# Patient Record
Sex: FEMALE | Race: BLACK OR AFRICAN AMERICAN | URBAN - METROPOLITAN AREA
[De-identification: names, ages, dates, MRNs, and addresses within clinical notes are randomized per-mention and may not be internally consistent; named-entity substitution may affect disease eponyms.]

---

## 2024-06-04 ENCOUNTER — HOME HEALTH ADMISSION (OUTPATIENT)
Dept: HOME HEALTH SERVICES | Facility: HOME HEALTH | Age: 74
End: 2024-06-04
Payer: MEDICARE

## 2024-06-05 ENCOUNTER — HOME CARE VISIT (OUTPATIENT)
Dept: SCHEDULING | Facility: HOME HEALTH | Age: 74
End: 2024-06-05

## 2024-06-05 VITALS
SYSTOLIC BLOOD PRESSURE: 164 MMHG | RESPIRATION RATE: 18 BRPM | DIASTOLIC BLOOD PRESSURE: 91 MMHG | HEART RATE: 87 BPM | TEMPERATURE: 97.9 F | OXYGEN SATURATION: 97 %

## 2024-06-05 PROCEDURE — 0221000100 HH NO PAY CLAIM PROCEDURE

## 2024-06-05 PROCEDURE — G0151 HHCP-SERV OF PT,EA 15 MIN: HCPCS

## 2024-06-05 ASSESSMENT — ENCOUNTER SYMPTOMS: PAIN LOCATION - PAIN QUALITY: DULL ACHE

## 2024-06-06 ENCOUNTER — HOME CARE VISIT (OUTPATIENT)
Dept: SCHEDULING | Facility: HOME HEALTH | Age: 74
End: 2024-06-06

## 2024-06-06 PROCEDURE — G0152 HHCP-SERV OF OT,EA 15 MIN: HCPCS

## 2024-06-06 NOTE — HOME HEALTH
Primary focus of care and skilled reason for admission/summary of clinical condition: Other specified rheumatoid arthritis, left knee     Subjective: Pt is an 73 yo female s/p recent appointment with her pcp with complaints of worsening L knee pain. Pt underwent cortisone injection. Skilled PT intervention orders received. PMH includes: RA, OA, Falls, Lupus, HTN, obesity.    Caregiver is available: Pt's son is available to assist as needed.  Medications reconciled and all medications are being management by the pt  Education was provided regarding medications, caregiver is able to demonstrate knowledge through teach back with 100 percent accuracy.    Following medication reconciliation, a list of reconciled medications will be given to the patient/caregiver and a copy has been uploaded to media.    Objective data:    BED MOBILITY: mod A with increased vc's for technique and safety awareness; pt's bed is very high, discussed posibility of lowering bed  TRANSFERS: mod A with vc's for technique, proper positioning at edge of seat, proper hand placement and safety awareness. Pt mainlu uses her lift chair to assist with standing.  GAIT: x 50ft with min A using 4ww for support; required increased vc's for positioning within her AD, HS, increasing PAWEL, postural alignment-pt tends to rest her forearms on AD requiring vc's  ASSISTIVE DEVICES USED CURRENTLY: 4ww  ASSISTIVE DEVICES NEEDED: none  ADLS: pt currently requires assistance from family  IADLS: pt currently requires assistance from family  FUNCTIONAL STRENGTH: 3/5 BLE on MMT  ROM DEFICITS: none  BALANCE: 15/28 on Tinetti Test  WOUNDS: none    PLOF:  Pt lives with her  in a single story home with 1 small step to enter. Pt's son lives close by and stops by daily to assist as needed. Pt has a 4ww that she uses for all mobility both inside her home and for community. Pt able to perform daily activities independently including driving. Pt does have a wc that she

## 2024-06-07 ENCOUNTER — HOME CARE VISIT (OUTPATIENT)
Dept: SCHEDULING | Facility: HOME HEALTH | Age: 74
End: 2024-06-07

## 2024-06-07 VITALS
TEMPERATURE: 97.7 F | DIASTOLIC BLOOD PRESSURE: 68 MMHG | RESPIRATION RATE: 18 BRPM | SYSTOLIC BLOOD PRESSURE: 144 MMHG | OXYGEN SATURATION: 97 % | HEART RATE: 75 BPM

## 2024-06-07 PROCEDURE — G0157 HHC PT ASSISTANT EA 15: HCPCS

## 2024-06-07 ASSESSMENT — ENCOUNTER SYMPTOMS: PAIN LOCATION - PAIN QUALITY: ACHE

## 2024-06-08 NOTE — HOME HEALTH
Primary focus of care and skilled reason for visit: L knee OA  Subjective: States when she stands to walk her pain comes on right away. Pt uses voltaren for topical cream for her knee which helps decrease the pain. Heat also decreases pain.   Medications reconciled and all medications are available in the home this visit.   The following education was provided regarding medications: taking as prescribed  Patient demonstrate knowledge through teach back with 100 percent accuracy.      TRANSFERS: STS from recliner and bed with Mod A; pt dependent on lift chair to get into standing. VC for proper hand placement for push off to decrease pulling on walker. Requires increase time for completion due to L knee pain and pt reports of feeling it crack.  GAIT:Pt amb with 4WW and mod A; 60ft with focus on improving safety and endurance with task. Required 1 seated rest periods and presents with forward posture, slow corazon, antalgic gait, and tendency to lean on forearms onto walker to offset weight on BLE. Instructed pt to not lean on forearms as it decreases pt ability to control walker and could cause falls; instructed to hold on to walker with B hands and place more weight on BUE when WB on LLE to offset weight. VC for upright posture. Requires multiple stnding rest periods.  THER EX: Restorator bike [id number] with pt able to consistently peddle however reports pain to L knee with continuation pain decreased. VC for better alignment of knee joint to decrease hip external rotation and keeping knee straight which pt reports felt better. Instructed in seated therex 10reps x 2 heel toe raises, hip flexion, hip abd, and LAQ to improve muscle strength for carryover to transfers. Exercise printout given to pt and reviewed for HEP to be completed daily. Pt verbalized understanding through teach back.    Assessment of treatment/improvements or declines from previous treatments:  Pt reports L knee pain decreased post treatment to

## 2024-06-10 VITALS
OXYGEN SATURATION: 96 % | TEMPERATURE: 98.1 F | SYSTOLIC BLOOD PRESSURE: 117 MMHG | DIASTOLIC BLOOD PRESSURE: 78 MMHG | RESPIRATION RATE: 18 BRPM | HEART RATE: 67 BPM

## 2024-06-10 ASSESSMENT — ENCOUNTER SYMPTOMS: HEMOPTYSIS: 0

## 2024-06-10 NOTE — HOME HEALTH
Pt is a 73 yo female s/p recent appointment with her pcp with complaints of worsening L knee pain. Pt underwent cortisone injection.PMH includes: RA, OA, Falls, Lupus, HTN, obesity, asthma.  Ptt  assists as possible with ADLs and transportation and son is available to assist as needed.  Medications reconciled and all medications are being managed by the pt.  Pt displays chronic bilateral knee pain, with increased pain in Left knee.  Pt stated she is not active and minimizes standing activities secondary to increased knee pain.  She ambulates throughout home with 4ww.  Family recently lowered bed allowing for increased bed mobility.  She completes UB ADLs with set up and LB with Eleni.  She is able to transfer with SBA/CGA.  Pt could benefit from skilled OT services including ther ex, balance, ADL, safety and and transfer training to improve independence with ADLs and functional transfers.  Pt agrees wth POC.

## 2024-06-11 ENCOUNTER — HOME CARE VISIT (OUTPATIENT)
Dept: SCHEDULING | Facility: HOME HEALTH | Age: 74
End: 2024-06-11

## 2024-06-11 PROCEDURE — G0157 HHC PT ASSISTANT EA 15: HCPCS

## 2024-06-12 VITALS
TEMPERATURE: 97.7 F | SYSTOLIC BLOOD PRESSURE: 138 MMHG | DIASTOLIC BLOOD PRESSURE: 82 MMHG | RESPIRATION RATE: 18 BRPM | HEART RATE: 70 BPM | OXYGEN SATURATION: 96 %

## 2024-06-12 ASSESSMENT — ENCOUNTER SYMPTOMS: PAIN LOCATION - PAIN QUALITY: ACHE

## 2024-06-12 NOTE — HOME HEALTH
Pt states the night after therapy pt slid out of the bed when she was trying to get up but there were no injuries. States her bed is too low.  and son got pt off the floor. Pt son ordered an equipment; large step stool with handles to assist pt with getting into bed but it will arrive next week.     THEREX: Instructed in seated exercises 12reps x2; heel toe raises, hip flexion, hip abd with red band, LAQ, and hip extension with red band. Focus on improving BLE muscle strength to stabilize joints and decrease pain. VC for reaching full range of motion and for proper form for joint alignment. Pt required rest periods due to fatigue and pain. Utilized exercise printouts and encouraged to continue with HEP daily. Pt verbalized understanding through teach back method.  ENERGY CONSERVATION: Educated on pacing with tasks and taking frequent rest periods as needed to conserve energy for prevention of fatigue.   EDEMA MANGEMENT: Assisted pt in elevating BLE to decrease swelling; with chair reclined, added a few pillows under pt BLE to further elevate. instructed in keeping knee extension and proper plcement of pillows to decrease knee flexion. Instructed pt to elevate a few times a day. Pt verblized understnding through teach back.      Pt declined gait training today due to reports of increase pain because of the weather. Agreed to seated activities only. Able to tolerate increased reps with therex and use of theraband. No reports of increase pain during exercises however noted poor muscle strength especially of the LLE needing more effort for completion. discussed with pt regarding DC when goals met. Next visit, gait training as able to, balance, transfers in bed, and strength training. DC when goals met.

## 2024-06-13 ENCOUNTER — HOME CARE VISIT (OUTPATIENT)
Dept: SCHEDULING | Facility: HOME HEALTH | Age: 74
End: 2024-06-13

## 2024-06-13 VITALS
HEART RATE: 79 BPM | SYSTOLIC BLOOD PRESSURE: 152 MMHG | OXYGEN SATURATION: 96 % | TEMPERATURE: 97.5 F | DIASTOLIC BLOOD PRESSURE: 88 MMHG | RESPIRATION RATE: 18 BRPM

## 2024-06-13 PROCEDURE — G0157 HHC PT ASSISTANT EA 15: HCPCS

## 2024-06-13 ASSESSMENT — ENCOUNTER SYMPTOMS: PAIN LOCATION - PAIN QUALITY: ACHE

## 2024-06-14 NOTE — HOME HEALTH
Primary focus of care and skilled reason for visit: L knee OA  Subjective: Pt reports less pain today and she slept well last night. States she did some of her exercises yesterday. Pt states she has been elevating BLE as instructed and swelling in legs has gone down.   Caregiver is available: spouse  Caregiver present at this visit and did not participate with clinician.  Medications have not changed since last session and pt taking meds as prescribed.      TRANSFERS: STS from bedside commode, bed, and recliner with chair at lower elevtion than preciously with focus on improving safety with task; requires increase time for completion; slow to get into standing. VC for reaching back and controlling descent when sitting down.   GAIT: Amb with 4WW, SBA 100ft with focus on improving endurance and pain prevention. Presents with unequal steps with R step being quicker than L step. VC for correction.   THER EX: 10reps standing  heel raises, hip extension, hip abd, hip flexion, and mini squats with VC for posture and keeping forward gaze. Seated therex 12reps heel toe raises, hip flexion, LAQ, and hip abd. Pt reports L knee pain 9/10 with exercises. Able to return demo exercises from previous sessions. VC for reaching full range for better form however more difficulty with LLE due to increase knee pain.         Assessment of treatment/improvements or declines from previous treatments: Making good progress and able to show carryover with exercises and return demonstrate back. Also improved gait quality with amb with pt taking better steps and able to amb with only needing to lean elbows on walker once.     Specific plan for next treatment: Gait training to improve mobility, therex for muscle strength, and management of pain.     Discharge planning discussed with patient. Discharge planning as follows: when goals met.  Patient verbalize agreement with discharge planning.   Written Teaching Material Utilized: Exercise

## 2024-06-17 ENCOUNTER — HOME CARE VISIT (OUTPATIENT)
Dept: HOME HEALTH SERVICES | Facility: HOME HEALTH | Age: 74
End: 2024-06-17
Payer: MEDICARE

## 2024-06-17 VITALS
DIASTOLIC BLOOD PRESSURE: 98 MMHG | OXYGEN SATURATION: 97 % | HEART RATE: 78 BPM | RESPIRATION RATE: 18 BRPM | SYSTOLIC BLOOD PRESSURE: 156 MMHG | TEMPERATURE: 98.1 F

## 2024-06-17 PROCEDURE — G0152 HHCP-SERV OF OT,EA 15 MIN: HCPCS

## 2024-06-17 ASSESSMENT — ENCOUNTER SYMPTOMS
HEMOPTYSIS: 0
DYSPNEA ACTIVITY LEVEL: AFTER AMBULATING MORE THAN 20 FT

## 2024-06-18 ENCOUNTER — HOME CARE VISIT (OUTPATIENT)
Dept: SCHEDULING | Facility: HOME HEALTH | Age: 74
End: 2024-06-18
Payer: MEDICARE

## 2024-06-18 VITALS
DIASTOLIC BLOOD PRESSURE: 82 MMHG | SYSTOLIC BLOOD PRESSURE: 150 MMHG | RESPIRATION RATE: 16 BRPM | TEMPERATURE: 97.5 F | HEART RATE: 90 BPM | OXYGEN SATURATION: 97 %

## 2024-06-18 PROCEDURE — G0157 HHC PT ASSISTANT EA 15: HCPCS

## 2024-06-18 ASSESSMENT — ENCOUNTER SYMPTOMS: PAIN LOCATION - PAIN QUALITY: ACHE

## 2024-06-18 NOTE — HOME HEALTH
Pt completed BUE AROM exercises 3x10 reps in all planes except shoulder flexion secondary to pain.  She displayed good dyn sit balance as she reached outside o PAWEL with right reactions present in order to bop balloon 4 sets of 20.  Pt also able to sit to stand with assist from recliner.  She maintained stand for 5-7 minutes at a time to bop balloon to son 4 sets of 15 with good right reactions.   Cont pt POC.

## 2024-06-19 ENCOUNTER — HOME CARE VISIT (OUTPATIENT)
Dept: SCHEDULING | Facility: HOME HEALTH | Age: 74
End: 2024-06-19
Payer: MEDICARE

## 2024-06-19 VITALS
HEART RATE: 81 BPM | RESPIRATION RATE: 18 BRPM | DIASTOLIC BLOOD PRESSURE: 66 MMHG | SYSTOLIC BLOOD PRESSURE: 142 MMHG | TEMPERATURE: 98.1 F | OXYGEN SATURATION: 98 %

## 2024-06-19 PROCEDURE — G0152 HHCP-SERV OF OT,EA 15 MIN: HCPCS

## 2024-06-19 ASSESSMENT — ENCOUNTER SYMPTOMS: HEMOPTYSIS: 0

## 2024-06-19 NOTE — HOME HEALTH
Primary focus of care and skilled reason for visit:  L knee OA  Subjective: Pt reports the pain is not as bad today. She feels her strength and endurance is \"coming along.\" She continues to do her exercises daily, more of the seated exercises. States she got her step stool with B hand rails in a few days ago and her son put it together for her, She has been using it and it is helping her get into bed better.       GAIT: Pt initially amb with 4WW 5ft with Min A and difficulties with taking steps. Switched pt over to FWW to allow pt to get closer inside walker for added stability which pt reported helped. Pt was able to amb 120ft with CGA, 1 seated rest and also navigate up and down single step leading to front door; VC for proper progression of BLE when navigtaing steps and stairs; affected LLE stepping down first and stronger unaffected RLE stepping up first. Pt able to return demo.  THER EX: Restoratore bike 1min 50sec to improve joint mobility; pt able to consistently peddle at slower speed and VC for proper joint alignment to decrease reports of L knee pain. 12reps x 2 seated therex with red band; hip abd, hip flexion, LAQ, and heel toe raises with focus on improving muscle strength to support joints. Pt utilizing exercise handout and able to return demo understanding of each exercise. Instructed pt to continue to use FWW as pt ambs better with it. Pt verbalized understanding through teach back.   BALANCE: Trained pt in how to safely get on and of the bed utilizing oversized stepping stool which has 2 hand rails that pt is able to use. Instructed in pushing stool up against the bed before atttempting to climb in as pt had a space between bed and step stool and attempted to get into bed forward facing while stepping backwards onto the stool. Push stool against pt bed and instructed pt to step up holding 2 bed rails before turning to sit down or side steping to get up before turning to sit down. When getting off

## 2024-06-20 ENCOUNTER — HOME CARE VISIT (OUTPATIENT)
Dept: SCHEDULING | Facility: HOME HEALTH | Age: 74
End: 2024-06-20
Payer: MEDICARE

## 2024-06-20 VITALS
TEMPERATURE: 97.7 F | SYSTOLIC BLOOD PRESSURE: 160 MMHG | DIASTOLIC BLOOD PRESSURE: 82 MMHG | HEART RATE: 88 BPM | OXYGEN SATURATION: 97 % | RESPIRATION RATE: 18 BRPM

## 2024-06-20 PROCEDURE — G0157 HHC PT ASSISTANT EA 15: HCPCS

## 2024-06-20 ASSESSMENT — ENCOUNTER SYMPTOMS: PAIN LOCATION - PAIN QUALITY: ACHE

## 2024-06-20 NOTE — HOME HEALTH
Primary focus of care and skilled reason for visit: OA of L knee   Subjective: Pt states she is having more pain in shoulders and hands today because she did some of her arm exercises yesterday. reports she did good yesterday. feels therapy is helping her some. She is walking with FWW vs 4WW because she feels like it is easier. Pt states she has not taken her blood pressure medication yet because she just woke up not too long ago. Pt is doing well with the large step stool into bed.   Medications are managed by pt and no changes since last with session.       THER EX: 12reps x 2 seated therex; heel toe raises, hip flexion with red band, hip abd with red band, hip extension with red band and band on bottom of foot, and LAQ with focus on improving muscle strength in order to support joints. Utilized exercise hand out and pt able to return demo understanding of exercises for HEP. Educated pt on continuation of exercises daily to prevent decline. Pt verbalized understanding through teach back.   TRANSFERS: STS from walker as pt was seated in walker upon arrival. Pt having difficulties getting up due to walker arms being too high which doesnt allow for pt to push up on. Required Mod A with VC for hand placement on knee and reclinfer arms next to pt. Walker seated > recliner with Min A; VC for hip knee flexion to improve foot clearance.  PAIN MANAGEMENT: Edu pt on use of heat to manage arthritic pain, topical creams as pt has been doing in the past and pain meds as prescribed. Pt verbalized understanding through teach back.     Assessment of treatment/improvements or declines from previous treatments: Pt declined to amb this session due to pain in her wrists. Pt able to complete all therex. Difficulty with transfers especially lower seats like walker and needing more assistance for task.     Specific plan for next treatment: Improving functional mobility, endurance, and strength for transfers and amb.     Discharge

## 2024-06-20 NOTE — HOME HEALTH
Pt requested to open blinds in kitchen and dining.  She required CGA to ambulate with RW around house and able to adjust blinds with CGA.  She stated it was a good day because she accomplished the walk and doing this task independently with pain at a tolerable level.  Pt completed 3 sets of 20 sit dynamic balance act from edge o chair with good right reaction present and use of BUE.  cont pt POC.

## 2024-06-24 ENCOUNTER — HOME CARE VISIT (OUTPATIENT)
Dept: HOME HEALTH SERVICES | Facility: HOME HEALTH | Age: 74
End: 2024-06-24
Payer: MEDICARE

## 2024-06-24 PROCEDURE — G0152 HHCP-SERV OF OT,EA 15 MIN: HCPCS

## 2024-06-25 ENCOUNTER — HOME CARE VISIT (OUTPATIENT)
Dept: SCHEDULING | Facility: HOME HEALTH | Age: 74
End: 2024-06-25
Payer: MEDICARE

## 2024-06-25 VITALS
RESPIRATION RATE: 18 BRPM | OXYGEN SATURATION: 96 % | SYSTOLIC BLOOD PRESSURE: 159 MMHG | HEART RATE: 76 BPM | DIASTOLIC BLOOD PRESSURE: 98 MMHG | TEMPERATURE: 98.5 F

## 2024-06-25 PROCEDURE — G0157 HHC PT ASSISTANT EA 15: HCPCS

## 2024-06-25 NOTE — HOME HEALTH
Pt completed BUE AROM exercises in all planes with AAROM for LUE shoulder flexion.  Pt completed edge of chair dyn sit balance activity with right reactions present and improved endurance as she participated for 5 mins at a time.  Cont pt POC.

## 2024-06-26 ENCOUNTER — HOME CARE VISIT (OUTPATIENT)
Dept: SCHEDULING | Facility: HOME HEALTH | Age: 74
End: 2024-06-26
Payer: MEDICARE

## 2024-06-26 VITALS
DIASTOLIC BLOOD PRESSURE: 86 MMHG | TEMPERATURE: 97.7 F | OXYGEN SATURATION: 95 % | RESPIRATION RATE: 16 BRPM | HEART RATE: 75 BPM | SYSTOLIC BLOOD PRESSURE: 156 MMHG

## 2024-06-26 PROCEDURE — G0152 HHCP-SERV OF OT,EA 15 MIN: HCPCS

## 2024-06-26 ASSESSMENT — ENCOUNTER SYMPTOMS: PAIN LOCATION - PAIN QUALITY: ACHE

## 2024-06-26 NOTE — HOME HEALTH
Patient reports she drove her  to his doctors appt today. She is using the FWW which seems to be helping her more. She is having more pain in L knee today. Pt states MD suggested for pt to go see ortho for surgery however pt is not wanting surgery so she is not going to seek out ortho for it. She feels she is not ready     Attempted amb however pt was only able to take 4 steps due to pain in L knee and wrists this session. Required a long time in between before pt was able to take each step and stood for a long time trying to amb but reports she is not going to be able to walk and requested to sit back down.   THEREX: 15reps x 2 heel toe raises, hip flexion, LAQ, hip abd with red band, hip extension with red band under soles of feet and instructing pt to step in downward motion, and ham curls with focus on improving muscle strength to support joints. Utilized exercise handouts and reviewed for pt to continue with HEP even after DC. Pt verbalized understanding through teach bck method.   STS with focus on pt balance stability from recliner to improve safety, pt utilizing elevation of seat as pt unable to stand from lower setting. VC for foot placement into more knee flexion for transfer and shifting weight anterior while keeping nose over toes. Required increase time for completion.     Pt unable to amb as far this session due to pain which is pts biggest limiting factor to her mobility. Some swelling in L ankle and knee this session and assisted pt in elevation post tx. Pt able to tolerate increase in therex however not doing HEP daily, per pt she is doing some. Pt has met goals and shows good understanding of exercises by ability to return demo exercises however did need occasional cues. Pt to see PT next visit for DC, Pt is aware and agreeable to DC.

## 2024-07-02 ENCOUNTER — HOME CARE VISIT (OUTPATIENT)
Dept: SCHEDULING | Facility: HOME HEALTH | Age: 74
End: 2024-07-02
Payer: MEDICARE

## 2024-07-02 PROCEDURE — G0151 HHCP-SERV OF PT,EA 15 MIN: HCPCS

## 2024-07-03 VITALS
TEMPERATURE: 97.8 F | OXYGEN SATURATION: 97 % | RESPIRATION RATE: 18 BRPM | DIASTOLIC BLOOD PRESSURE: 61 MMHG | HEART RATE: 70 BPM | SYSTOLIC BLOOD PRESSURE: 115 MMHG

## 2024-07-05 VITALS — OXYGEN SATURATION: 97 % | TEMPERATURE: 98.1 F | HEART RATE: 76 BPM | RESPIRATION RATE: 18 BRPM

## 2024-07-05 NOTE — HOME HEALTH
Client is at functional max capacity as she is able to access all areas of her property with her as she is doing good with her HEP and is at maximal level of function. Patient is very good with her PT functional progress as she is very happy with GOE and ready for discharge as she is at her functional max capacity. Patient was able to ambulate x 5 feet with her gait with VC to increase her corazon and increase her step length as patient had too high knee pain in order to ambulate any further. Patient is making good progress with progressing her resistance loads but still warranted education on importance of improving her quad strength and HS strength in order to maintain her optimal level of function.

## 2024-07-06 NOTE — HOME HEALTH
Pt participated in skilled OT services including ther ex/HEP, safety, balance, ADL and transfer training.  She made good progress toward OT goals and returned to PLOF as she is able to complete ADLs and functional ambulation with supervision/SBA.  She has a  and son that assist with transportation and IADLs. She purchased step to get into bed safely.  Therapist instructed pt on safe tech to get in with step and out b standing beside step.  Pt displays good bed mobility to manuever and scoot toward bottom of bed in order to exit with no use of step.  Pt agrees with DC from OT at this time.

## 2024-07-12 ASSESSMENT — ENCOUNTER SYMPTOMS: PAIN LOCATION - PAIN QUALITY: SORE

## 2024-07-22 ENCOUNTER — HOME HEALTH ADMISSION (OUTPATIENT)
Dept: HOME HEALTH SERVICES | Facility: HOME HEALTH | Age: 74
End: 2024-07-22
Payer: MEDICARE

## 2024-07-23 ENCOUNTER — HOME CARE VISIT (OUTPATIENT)
Dept: SCHEDULING | Facility: HOME HEALTH | Age: 74
End: 2024-07-23

## 2024-07-23 PROCEDURE — 0221000100 HH NO PAY CLAIM PROCEDURE

## 2024-07-23 PROCEDURE — G0151 HHCP-SERV OF PT,EA 15 MIN: HCPCS

## 2024-07-24 ENCOUNTER — HOME CARE VISIT (OUTPATIENT)
Dept: SCHEDULING | Facility: HOME HEALTH | Age: 74
End: 2024-07-24

## 2024-07-24 VITALS
TEMPERATURE: 98.3 F | RESPIRATION RATE: 18 BRPM | HEART RATE: 78 BPM | DIASTOLIC BLOOD PRESSURE: 76 MMHG | OXYGEN SATURATION: 97 % | SYSTOLIC BLOOD PRESSURE: 132 MMHG

## 2024-07-24 PROCEDURE — G0152 HHCP-SERV OF OT,EA 15 MIN: HCPCS

## 2024-07-25 VITALS
HEART RATE: 84 BPM | TEMPERATURE: 97.8 F | OXYGEN SATURATION: 96 % | SYSTOLIC BLOOD PRESSURE: 150 MMHG | DIASTOLIC BLOOD PRESSURE: 93 MMHG

## 2024-07-25 NOTE — HOME HEALTH
Pt is a 73 y/o F referred to skilled OT services for increased BLE pain (left worse than right) and b/l wrists 2/2 RA affecting pts ability to safely complete ADL and functional mobility tasks.    PMHx includes RA, HTN, morbid obesity, MDD, heart failure, OA    Pt has been evaluated by OT with the following findings:    ADLs: SBA with 4WW  IADLs:  completes at this time but pt wishes to improve independence in light IADLs.  FUNCTIONAL MOBILITY: SBA with 4WW  COGNITIVE ASSESSMENT: A&Ox4.  UE strength: 4-/5 MMS grossly for age  UE ROM: WFL  CAREGIVER INVOLVMENT:  is able to assist as needed.  MEDICATION: Pt manages her medications independently.   DME PRESENT: 4WW, shower bench    PLOF: Independent with no AD for ADL, light IADLs, and functional mobility tasks.  Home Setup: [facility] with 1-2 small steps to enter. Walk in tub/shower combo with bench and grab bars available. Standard toilet.     Pt presents with decreased strength and coordination, impaired dynamic standing balance, increased pain to L knee and b/l wrists limiting her function, and decreased endurance necessary for safe ADL and IADL completion. Pt wishes to decrease her pain in order to increase participation with ADL and IADL tasks. Pt would benefit from skilled OT services to promote pain mgmt techniques, balance, coordination, strengthening, and safety awareness in order to maximize independence and safety in self-care and functional mobility tasks. Without skilled OT services, pt is at increased risk for falls, trauma secondary to falls, increased caregiver burden, limited OOB time, depression, and further decline in functional status.    Requested frequency of 1w1, 2w3, 1w1

## 2024-07-30 ENCOUNTER — HOME CARE VISIT (OUTPATIENT)
Dept: SCHEDULING | Facility: HOME HEALTH | Age: 74
End: 2024-07-30

## 2024-07-30 VITALS
DIASTOLIC BLOOD PRESSURE: 73 MMHG | TEMPERATURE: 97.5 F | OXYGEN SATURATION: 95 % | SYSTOLIC BLOOD PRESSURE: 151 MMHG | HEART RATE: 80 BPM

## 2024-07-30 PROCEDURE — G0152 HHCP-SERV OF OT,EA 15 MIN: HCPCS

## 2024-07-30 NOTE — HOME HEALTH
No falls reported since last visit. Vitals assessed, all WNL within parameters. Pt reports 3/10 pain L wrist and 4/10 R wrist at start of session.  OTR facilitated use of heating pad to b/l hands and wrists x10-12 minutes. OTR educated pt on use of theraputty for pain mgmt and to increase FMC and  strength. OTR administered theraputty, instructed pt it can be microwaved in 10 second increments before exercises. OTR instructed pt in theraputty exercises with use focus on  and pinch strength to improve FMC and hand strength, and to overall decrease arthritic pain to b/l hands and wrist. Pt completes theraputty exercises with minimal verbal cueing x20 minutes, reporting throughout \"this feels so good on my hands\" with decreased pain. OTR facilitated gentle b/l hand and wrist massage at end of session with use of lotion. Pt reporting decreased pain at end of session. OTR encouraged pt to complete theraputty exercises throughout the week and pt demonstrated understanding. Plan to continue to train pt in HEP, and to begin addressing safe cooking and home mgmt tasks.

## 2024-07-31 ENCOUNTER — HOME CARE VISIT (OUTPATIENT)
Dept: SCHEDULING | Facility: HOME HEALTH | Age: 74
End: 2024-07-31

## 2024-07-31 VITALS
RESPIRATION RATE: 18 BRPM | SYSTOLIC BLOOD PRESSURE: 130 MMHG | TEMPERATURE: 97.5 F | DIASTOLIC BLOOD PRESSURE: 88 MMHG | OXYGEN SATURATION: 96 % | HEART RATE: 75 BPM

## 2024-07-31 PROCEDURE — G0157 HHC PT ASSISTANT EA 15: HCPCS

## 2024-07-31 ASSESSMENT — ENCOUNTER SYMPTOMS: PAIN LOCATION - PAIN QUALITY: ACHE

## 2024-07-31 NOTE — HOME HEALTH
Primary focus of care and skilled reason for visit: RA  Subjective: Pt uses upright walker for all home mobility. She has been taking a hot bath every day in the morning which is helping. She also uses topical cream and patches for the pain.   Caregiver is available: Spouse is available to assist as needed.  Medications are available and manages her own meds.    GAIT: Amb with upright walker; Min A; 70ft with antalgic gait, slow corazon, decrease R hip knee flexion with more sliding of foot. VC for increasing weight through B elbows with L stance and when putting weight on LLE to offset weight to improve pain and allow for RLE to progress better. Pt able to return demonstrate understanding with improved quality.   THER EX: 12reps x2 hip abd, hip extension, and hip flexion with green band, ankle pumps, heel toe raises, and LAQ with focus on improving muscles strength for ambulation. VC for proper positioning with each exercise and for breathing with exercise. Reporting pain increase in L knee 6/10 with LAQ but relieves once pt brings knee back to relaxed starting position.   TRANSFERS: STS from recliner with Min A and recliner slightly elevated to assist with transfers. VC for controlled descent into chair and ensuring pt is reaching back for chair before sitting down. pt verbalized undersnding through teach back method.    Assessment of treatment/improvements or declines from previous treatments: Functional mobility is limited by pain in B knees making it difficult for pt to amb without deviations or pain. More pain in LLE making it difficult to complete therex requiring rest periods with LLE.     Specific plan for next treatment: Therex to improve strength for transfers, gait training to improve safety with walker, balance training.     Discharge planning discussed with patient. Discharge planning as follows:  When goals met.  Patient verbalize agreement with discharge planning.   Written Teaching Material

## 2024-08-01 ENCOUNTER — HOME CARE VISIT (OUTPATIENT)
Dept: SCHEDULING | Facility: HOME HEALTH | Age: 74
End: 2024-08-01

## 2024-08-01 PROCEDURE — G0152 HHCP-SERV OF OT,EA 15 MIN: HCPCS

## 2024-08-02 ENCOUNTER — HOME CARE VISIT (OUTPATIENT)
Dept: SCHEDULING | Facility: HOME HEALTH | Age: 74
End: 2024-08-02

## 2024-08-02 VITALS
OXYGEN SATURATION: 95 % | TEMPERATURE: 97.3 F | SYSTOLIC BLOOD PRESSURE: 160 MMHG | DIASTOLIC BLOOD PRESSURE: 86 MMHG | RESPIRATION RATE: 16 BRPM | HEART RATE: 74 BPM

## 2024-08-02 VITALS
OXYGEN SATURATION: 97 % | RESPIRATION RATE: 16 BRPM | TEMPERATURE: 97.9 F | SYSTOLIC BLOOD PRESSURE: 153 MMHG | HEART RATE: 88 BPM | DIASTOLIC BLOOD PRESSURE: 82 MMHG

## 2024-08-02 PROCEDURE — G0157 HHC PT ASSISTANT EA 15: HCPCS

## 2024-08-02 ASSESSMENT — ENCOUNTER SYMPTOMS: PAIN LOCATION - PAIN QUALITY: ACHE

## 2024-08-02 NOTE — HOME HEALTH
No falls reported since last visit. No changes in medications.  Vitals assessed at start of session, BP slightly elevated, but within parameters. After vitals were assessed, pt reported forgetting to take blood pressure medication earlier in the morning, was able to take following vital assessment. Pt reports 7/10 pain b/l wrists; pt requesting to continue to work with theraputty exercises to decrease pain in b/l hand and wrists. Pt reports relief from pain following previous session when working with theraputty. OTR facilitated use of heating pad x10 mins to b/l hand and wrists. Theraputty heated up in microwave; pt able to complete theraputty exercises with use of handout with SBA. OTR facilitated gentle massage ot b/l hand and wrists at end of session with gentle stretch. Pt reports decreased pain at end of session. Plan to continue to address pain mgmt techniques with pt to overall improve independence and participation in ADL and IADL tasks of choice.

## 2024-08-02 NOTE — HOME HEALTH
Primary focus of care and skilled reason for visit: L knee pain  Subjective: Pt states she is feeling pretty good today.      GAIT: Amb with pt in home  x50ft and unlevel surface x50ft; with focus on improving safety with getting in and out of pt home for ability to amb to car for appts. Instructed pt in proper techniques to utilize when navigating steps; stepping down with more affected LLE and stepping up with stronger RLE. VC for safety of using locking mechanism of squeezing brakes when stepping down to decrease walker from sliding forward. Pt requires assistance of progression of walker as it is heavy.   THER EX:15reps x 2 BLE seated exercises hip abd with red band, hip extension with red band, hip flexion with red band, LAQ, ankle pumps, and ankle in circular rotations. VC for slower moveemnt in all direction of travel and for proper breathing with exercise. L knee pain with LAQ and pt only able to complete 7reps. Focus on improving strength for transfers.  BALANCE: STS from recliner with focus on improving stability; CGA  with VC for scooting forward in chair. Pt did not have to depend on utilizing chair elevation this session for transfers. Pt did require increase time.       Assessment of treatment/improvements or declines from previous treatments: Pt is making good progress toward goals with increase in gait distance and able to amb unlevel surfcae however still unsafe with getting in and out of home and navigating 2 single steps at front door whcih pt requires assistnce with upright walker as it is heavy. Pt reports pain level post tx at 5/10.    Specific plan for next treatment: Gait training unlevel surface, therex to improve muscle strength.     Discharge planning discussed with patient. Discharge planning as follows: When goals met  Patient verbalize agreement with discharge planning.   Written Teaching Material Utilized: Exercise printout  Patient demonstrate good understanding via teach back method.

## 2024-08-05 ENCOUNTER — HOME CARE VISIT (OUTPATIENT)
Dept: SCHEDULING | Facility: HOME HEALTH | Age: 74
End: 2024-08-05

## 2024-08-05 VITALS
OXYGEN SATURATION: 98 % | SYSTOLIC BLOOD PRESSURE: 170 MMHG | DIASTOLIC BLOOD PRESSURE: 88 MMHG | TEMPERATURE: 97.5 F | HEART RATE: 69 BPM | RESPIRATION RATE: 16 BRPM

## 2024-08-05 PROCEDURE — G0157 HHC PT ASSISTANT EA 15: HCPCS

## 2024-08-05 ASSESSMENT — ENCOUNTER SYMPTOMS: PAIN LOCATION - PAIN QUALITY: ACHE

## 2024-08-05 NOTE — HOME HEALTH
Primary focus of care and skilled reason for visit:  L knee OA  Subjective: Pt reports she is having more pain in L wrist today from arthritis. She continues to use heat for the pain.     GAIT: Pt amb with upright 4WW and SBA unlevel surface 150ft with focus on improving safety with community amb and getting to driveway to car for doctor appts. Focus on how to properly navigate steps and improving independence with progression of walker. Pt required CGA for progression of walker and able to show carryover of proper sequencing to step down and up on steps. VC for utilizing lock mechanism when stepping down or up by squeezing brakes to improve safety. Pt verbalized understanding through teach back. Pt reports L knee pain is feeling better post gait training and dropping to 4/10.  TRANSFERS: Instructed pt in STS from recliner with recliner slightly elevated midway; SBA, 8reps with focus on improving technqiues. Required multiple attempts on first initial stand. VC for scooting bottom forward and shifting weight anteriorly. VC for extending LLE forward slightly as pt reported some pain so that itll decrease pressure with transfer onto LLE. Pt reports it feeling better that way and was able to show progress within session with faster trnsfers at first attempt.     Assessment of treatment/improvements or declines from previous treatments: Pt making progress toward goals with ability to amb up and down drive way this session, slow pacing but good safety awareness. Depending on pain level pt continues to have difficulty with transfers but able to show progress this session.     Specific plan for next treatment: unlevel surface amb, therex, and improving balance stability for prevention of falls.     Discharge planning discussed with patient. Discharge planning as follows: When goals met  Patient verbalize agreement with discharge planning.

## 2024-08-06 ENCOUNTER — HOME CARE VISIT (OUTPATIENT)
Dept: SCHEDULING | Facility: HOME HEALTH | Age: 74
End: 2024-08-06

## 2024-08-06 PROCEDURE — G0152 HHCP-SERV OF OT,EA 15 MIN: HCPCS

## 2024-08-07 ENCOUNTER — HOME CARE VISIT (OUTPATIENT)
Dept: SCHEDULING | Facility: HOME HEALTH | Age: 74
End: 2024-08-07

## 2024-08-07 VITALS
RESPIRATION RATE: 16 BRPM | TEMPERATURE: 97.5 F | DIASTOLIC BLOOD PRESSURE: 88 MMHG | HEART RATE: 78 BPM | SYSTOLIC BLOOD PRESSURE: 155 MMHG | OXYGEN SATURATION: 96 %

## 2024-08-07 VITALS
TEMPERATURE: 97.3 F | SYSTOLIC BLOOD PRESSURE: 142 MMHG | HEART RATE: 78 BPM | RESPIRATION RATE: 18 BRPM | DIASTOLIC BLOOD PRESSURE: 72 MMHG | OXYGEN SATURATION: 97 %

## 2024-08-07 PROCEDURE — G0157 HHC PT ASSISTANT EA 15: HCPCS

## 2024-08-07 ASSESSMENT — ENCOUNTER SYMPTOMS: PAIN LOCATION - PAIN QUALITY: ACHE

## 2024-08-07 NOTE — HOME HEALTH
No falls or changes in medications reported since last visit.   VItals assessed at start of session, all WNL. Pt reports 4/10 b/l wrist pain, reports she has been completing exercises on her own with use of heat which helps her pain. OTR instructed pt in nerve gliding exercises while seated with use of handout for 2 sets x 10 reps each exercise. OTR facilitated gentle massage to b/l hands and wrists with use of lotion. OTR instructed pt in theraputty exercises while seated x25 minutes. Pt reports decreased pain following exercises. Plan to initiated BUE strengthening exercises and begin to address cooking tasks at next visit.

## 2024-08-08 ENCOUNTER — HOME CARE VISIT (OUTPATIENT)
Dept: SCHEDULING | Facility: HOME HEALTH | Age: 74
End: 2024-08-08

## 2024-08-08 VITALS
OXYGEN SATURATION: 98 % | DIASTOLIC BLOOD PRESSURE: 93 MMHG | SYSTOLIC BLOOD PRESSURE: 153 MMHG | TEMPERATURE: 97.5 F | RESPIRATION RATE: 16 BRPM | HEART RATE: 73 BPM

## 2024-08-08 PROCEDURE — G0152 HHCP-SERV OF OT,EA 15 MIN: HCPCS

## 2024-08-08 NOTE — HOME HEALTH
Primary focus of care and skilled reason for visit: L knee OA  Subjective: Pt states she has tried to use her quad cane and she was able to walk from her room to living room. Her wrists are doing better today.   Caregiver is available: Spouse and son lives near by and comes bt to assist as needed.      GAIT: Pt amb with upright walker with SBA-CGA unlevel surface 200ft down driveway and into the street navigating curbs and inclines. Navigating down 2 single steps leaving pt home with pt able to show carryover of progression of walker; VC for moveing hand placement on walker when stepping down larger step and for utilizing brakes to ensure walker does not slide out. Pt presents with slow but steady pacing and no increase in pain reported in knees. Pt did require increase time upon initial walking due to pain however pain dissipated after 5ft. Amb within pt home 50ft with quad cane and CGA with focus on progressing pt to least restrictive device.   THER EX: 15reps seated LAQ, hip flexion, hip extension, and abd with red band and focus on improving muscle strength for transfers. VC for reaching full range. Pt reporting some pain increase with L knee extension and needing increase time to complete. Instructed in STS transfers from recliner with chair at about 100 degrees elevation and focus on improving techniques and independence. Pt requires mutliple attempts and repositioning with 1x cue for scooting bottom forward.       Assessment of treatment/improvements or declines from previous treatments: Pt continues to show progress and increasing in gait distance without onset of pain. pt also requiring decrease assistance with navigating single steps with uprgiht 4WW. Pt also progress to quad cane this session however will require continued training to improve safety with use.     Specific plan for next treatment: Gait training progressing to least restrictive device, therex for muscle strength for transfers, and balance

## 2024-08-08 NOTE — HOME HEALTH
No falls or changes in medications since last visit.  Vitals assessed at start of session, all WNL. Pt repors 4/10 pain b/l wrists. OTR facilitated gentle massage and gentle stretches to b/l wrists. Pt declining to address decline in cooking tasks and prefer to focus on BUE strength for todays tx session. OTR instructed pt in BUE strengthening exercises with makeshift 2# free weights with handout; pt able to complete 2 sets x10 reps all exercises with minimal cuing for proper techniques. Pt required extended rest breaks between sets. Pt reporting soreness following exercises, but no pain. OTR facilitated gentle massage to b/l shoulders, posterior neck, and proximal UE's at end of session x15 minutes. Pt reported immediate relief. Plans to addrss decline in cooking tasks at next session.

## 2024-08-13 ENCOUNTER — HOME CARE VISIT (OUTPATIENT)
Dept: SCHEDULING | Facility: HOME HEALTH | Age: 74
End: 2024-08-13

## 2024-08-13 PROCEDURE — G0152 HHCP-SERV OF OT,EA 15 MIN: HCPCS

## 2024-08-14 VITALS
DIASTOLIC BLOOD PRESSURE: 82 MMHG | SYSTOLIC BLOOD PRESSURE: 151 MMHG | RESPIRATION RATE: 16 BRPM | OXYGEN SATURATION: 98 % | TEMPERATURE: 97.5 F | HEART RATE: 88 BPM

## 2024-08-14 NOTE — HOME HEALTH
Vitals assessed at start of session, all WNL however BP elevated. Pt reported she forgot to take BP medications after BP reading; pt able to take medication independently. Pt also reports increased soreness/pain to BUL[place], states she had a busy weekend and she was \"moving around a lot.\" Pt requesting lighter session on this date. OTR instructed pt in seated HEP with handout from last tx session, AROM only with no weight for 2 sets x12 reps. Pt able to complete entirety of HEP with no weight with SBA, only 1 cue for proper techniques. Pt required extended rest breaks between sets. OTR facilitated gentle message to BUEs and cervical neck x15 minutes. Pt educated pt on compensatory strategies to utilize during IADLs for pain mgmt including seated vs standing tasks, pacing strategies, use of heat prior/post IADL completion, and use of rest breaks; pt verbalized understanding. OTR led discussion at end of session regarding plans for DC from OT services next week, with pt reporting she is in agreement.

## 2024-08-15 ENCOUNTER — HOME CARE VISIT (OUTPATIENT)
Dept: SCHEDULING | Facility: HOME HEALTH | Age: 74
End: 2024-08-15

## 2024-08-15 PROCEDURE — G0152 HHCP-SERV OF OT,EA 15 MIN: HCPCS

## 2024-08-16 ENCOUNTER — HOME CARE VISIT (OUTPATIENT)
Dept: SCHEDULING | Facility: HOME HEALTH | Age: 74
End: 2024-08-16

## 2024-08-16 VITALS
HEART RATE: 79 BPM | RESPIRATION RATE: 16 BRPM | DIASTOLIC BLOOD PRESSURE: 75 MMHG | SYSTOLIC BLOOD PRESSURE: 129 MMHG | OXYGEN SATURATION: 98 % | TEMPERATURE: 98.9 F

## 2024-08-16 PROCEDURE — G0157 HHC PT ASSISTANT EA 15: HCPCS

## 2024-08-16 ASSESSMENT — ENCOUNTER SYMPTOMS: PAIN LOCATION - PAIN QUALITY: ACHY

## 2024-08-16 NOTE — HOME HEALTH
No falls or changes in medications reported since last visit.   Vitals awssessed at start of session, all WNL within parameters. Pt reports 3/10 pain b/l wrists, reports they \"feel good today.\"  OTR facilitated heat to BUE x10 minutes. OTR instructed pt in BUE light strengthening exercises with use of handout, for 2 sets x12-15 reps each exercise with therapeutic rest breaks between sets. OTR instructed pt in gentle stretching techniques to incorporate into HEP with return demo. OTR facilitated gentle massage to b/l shoulders and b/l wrists x15 minutes with pt reports decreased pain at end of session. OTR educated and discussed with pt regarding dc from OT services at next session; pt was active member of discussion and demonstrated understanding, stating \"I feel ready for discharge.\" Plan for DC from OT services at next session.

## 2024-08-17 VITALS
SYSTOLIC BLOOD PRESSURE: 140 MMHG | DIASTOLIC BLOOD PRESSURE: 80 MMHG | OXYGEN SATURATION: 95 % | RESPIRATION RATE: 18 BRPM | TEMPERATURE: 97.7 F | HEART RATE: 83 BPM

## 2024-08-17 ASSESSMENT — ENCOUNTER SYMPTOMS: PAIN LOCATION - PAIN QUALITY: ACHE

## 2024-08-17 NOTE — HOME HEALTH
Primary focus of care and skilled reason for visit: L knee OA  Subjective: Pt states she is having some pain today in knee because she did a lot of walking already. States she did not want to do walking today because of pain. Pt is making longer walks as instructed from previous sessions, taking the longer way back from the bathroom. States she is using the cane in the house sometimes when the knee is not hurting too much.     THER EX: Restorator bike 5min with focus on improving joint mobility to decrease pain and improve muscle endurance. Pt required a few readjustments in the beginning due to pain in L knee. Instructed in 20reps x 2 Seated therex; with green band pt complete hip flexion, hip extension, hip abd, and without band pt completed LAQ and ankle pumps with VC for working in pain free ranges and reaching full range. Pt demonstrates understanding of HEP and continues to do some of her HEP daily.   TRANSFERS: STS from recliner and pt showing good safety with proper carryover of techniques from previous session without need to elevate seat to get pt into standing.       Assessment of treatment/improvements or declines from previous treatments: Pt presenting to be in more pain this session as pt reports she has done more walking this morning. Pt able to tolerate increase in reps with therex and increase resistance band without issues. Pain continues to limit pts mobility. Pt has made great progress with transfers and no longer depends on elevation of recliner to get into standing.     Specific plan for next treatment: Pt to see PT next visit.     Discharge planning discussed with patient and caregiver. Discharge planning as follows: Dc when goals met.  Patient verbalize agreement with discharge planning.   Written Teaching Material Utilized: Exercise printouts  Patient demonstrate good understanding via teach back method

## 2024-08-20 ENCOUNTER — HOME CARE VISIT (OUTPATIENT)
Dept: SCHEDULING | Facility: HOME HEALTH | Age: 74
End: 2024-08-20
Payer: MEDICARE

## 2024-08-20 VITALS
TEMPERATURE: 97.9 F | DIASTOLIC BLOOD PRESSURE: 78 MMHG | SYSTOLIC BLOOD PRESSURE: 131 MMHG | RESPIRATION RATE: 16 BRPM | OXYGEN SATURATION: 98 % | HEART RATE: 70 BPM

## 2024-08-20 PROCEDURE — G0152 HHCP-SERV OF OT,EA 15 MIN: HCPCS

## 2024-08-20 NOTE — HOME HEALTH
Pt seen for OT discharge evaluation with the following findings.    ADLs: mod I with 4WW  IADLs: mod I with 4WW For simple meal prep and light housekeeping, increased time.   FUNCTIONAL MOBILITY: mod I with 4WW  COGNITIVE ASSESSMENT: A&Ox4  UE strength: 4/5 MMS grossly for age  UE ROM: WFL  MEDICATION: Pt independently manages medications.   DME PRESENT: 4WW, shower chair    Pt being discharged from OT services at this time as she has met all OT goals. Pt encouraged to continue with HEP and to use pain mgmt strategies that she has learned throughout POC for arthritic pain. Pt educated during DC session regarding DC from OT services as all goals are met; pt was active member of discussion and reports she has learned a lot and feels ready for dc from OT services.

## 2024-08-21 ENCOUNTER — HOME CARE VISIT (OUTPATIENT)
Dept: SCHEDULING | Facility: HOME HEALTH | Age: 74
End: 2024-08-21
Payer: MEDICARE

## 2024-08-21 PROCEDURE — G0151 HHCP-SERV OF PT,EA 15 MIN: HCPCS

## 2024-08-26 ENCOUNTER — HOME CARE VISIT (OUTPATIENT)
Dept: SCHEDULING | Facility: HOME HEALTH | Age: 74
End: 2024-08-26
Payer: MEDICARE

## 2024-08-26 VITALS
DIASTOLIC BLOOD PRESSURE: 80 MMHG | HEART RATE: 95 BPM | TEMPERATURE: 97.3 F | SYSTOLIC BLOOD PRESSURE: 150 MMHG | OXYGEN SATURATION: 98 % | RESPIRATION RATE: 18 BRPM

## 2024-08-26 VITALS
RESPIRATION RATE: 18 BRPM | DIASTOLIC BLOOD PRESSURE: 76 MMHG | TEMPERATURE: 97.8 F | OXYGEN SATURATION: 97 % | SYSTOLIC BLOOD PRESSURE: 118 MMHG | HEART RATE: 78 BPM

## 2024-08-26 PROCEDURE — G0157 HHC PT ASSISTANT EA 15: HCPCS

## 2024-08-26 ASSESSMENT — ENCOUNTER SYMPTOMS: PAIN LOCATION - PAIN QUALITY: ACHE

## 2024-08-26 NOTE — HOME HEALTH
Patient is progressing in her PT but is still a moderate fall risk with limited mobility as she fatigues easily and isn't at maximal level of function that warrants home health PT. Patient was able to ambulate x 200 feet with VC to increase her hip extension and toe off along with VC to increase corazon as she isn't able to access all areas of her property without supervision assistance.

## 2024-08-27 NOTE — HOME HEALTH
Primary focus of care and skilled reason for visit: L knee OA  Subjective: Pt states she was able to go out yesterday with family for the first time but her knees were bothering her and so she had to rest for the rest of the day the next day. States she does feel she is getting around better but L knee still bothers her.   Caregiver is available: Spouse and son available to assist pt as needed.      GAIT: Pt amb with upright 4WW and SBA unlevel surface 200ft, navigating down 2 single steps at front door. Able to go down first step fine but unsafe with second step as it is a wider and steeper step pt has to get walker over and presents with excessive trunk flexion which decreases safety. Vc for moving hands from hand rest and placing on elbow rest to push walker down before transitioning hands back to improve safety.   THER EX: instructed pt in standing therex; 12reps standing heel raises, hip flexion and hip extension with VC for posture and proper form to decrease knee flexion.  Exercise printout utilized and adjustments made to reps with exercises. Pt presenting wiht fatigue and needing rest periods between exercises.       Assessment of treatment/improvements or declines from previous treatments: Pt making good progress however pain still continues to limit pts functional mobility. Pt reports pain decreased to 4/10 post tx. Fall risk as still noted some decrease safety when exiting pt home.    Specific plan for next treatment: Pt to see PT next visit.     Discharge planning discussed with patient and caregiver. Discharge planning as follows: Dc when goals met.  Patient/caregiver verbalize agreement with discharge planning.   Written Teaching Material Utilized: Exercise printout  Patient demonstrate good understanding via teach back method.

## 2024-09-06 ENCOUNTER — HOME CARE VISIT (OUTPATIENT)
Dept: SCHEDULING | Facility: HOME HEALTH | Age: 74
End: 2024-09-06
Payer: MEDICARE

## 2024-09-06 PROCEDURE — G0151 HHCP-SERV OF PT,EA 15 MIN: HCPCS

## 2024-09-11 VITALS
HEART RATE: 78 BPM | SYSTOLIC BLOOD PRESSURE: 123 MMHG | TEMPERATURE: 98 F | DIASTOLIC BLOOD PRESSURE: 68 MMHG | OXYGEN SATURATION: 97 % | RESPIRATION RATE: 18 BRPM

## 2024-09-14 ENCOUNTER — HOME CARE VISIT (OUTPATIENT)
Dept: HOME HEALTH SERVICES | Facility: HOME HEALTH | Age: 74
End: 2024-09-14
Payer: MEDICARE

## 2024-09-20 ENCOUNTER — HOME CARE VISIT (OUTPATIENT)
Dept: HOME HEALTH SERVICES | Facility: HOME HEALTH | Age: 74
End: 2024-09-20
Payer: MEDICARE

## 2024-09-23 ENCOUNTER — HOME CARE VISIT (OUTPATIENT)
Dept: HOME HEALTH SERVICES | Facility: HOME HEALTH | Age: 74
End: 2024-09-23